# Patient Record
Sex: MALE | Race: WHITE | NOT HISPANIC OR LATINO
[De-identification: names, ages, dates, MRNs, and addresses within clinical notes are randomized per-mention and may not be internally consistent; named-entity substitution may affect disease eponyms.]

---

## 2020-04-26 ENCOUNTER — TRANSCRIPTION ENCOUNTER (OUTPATIENT)
Age: 60
End: 2020-04-26

## 2023-02-07 PROBLEM — Z00.00 ENCOUNTER FOR PREVENTIVE HEALTH EXAMINATION: Status: ACTIVE | Noted: 2023-02-07

## 2023-02-08 ENCOUNTER — APPOINTMENT (OUTPATIENT)
Dept: ORTHOPEDIC SURGERY | Facility: CLINIC | Age: 63
End: 2023-02-08
Payer: COMMERCIAL

## 2023-02-08 VITALS — HEIGHT: 71 IN | BODY MASS INDEX: 37.8 KG/M2 | WEIGHT: 270 LBS

## 2023-02-08 DIAGNOSIS — Z87.2 PERSONAL HISTORY OF DISEASES OF THE SKIN AND SUBCUTANEOUS TISSUE: ICD-10-CM

## 2023-02-08 DIAGNOSIS — I10 ESSENTIAL (PRIMARY) HYPERTENSION: ICD-10-CM

## 2023-02-08 DIAGNOSIS — J45.909 UNSPECIFIED ASTHMA, UNCOMPLICATED: ICD-10-CM

## 2023-02-08 PROCEDURE — 73564 X-RAY EXAM KNEE 4 OR MORE: CPT | Mod: 50

## 2023-02-08 PROCEDURE — 99204 OFFICE O/P NEW MOD 45 MIN: CPT

## 2023-02-08 RX ORDER — ASPIRIN 81 MG
81 TABLET, DELAYED RELEASE (ENTERIC COATED) ORAL
Refills: 0 | Status: ACTIVE | COMMUNITY

## 2023-02-08 RX ORDER — VALSARTAN 80 MG/1
80 TABLET, COATED ORAL
Refills: 0 | Status: ACTIVE | COMMUNITY

## 2023-02-08 RX ORDER — CETIRIZINE HCL 10 MG
TABLET ORAL
Refills: 0 | Status: ACTIVE | COMMUNITY

## 2023-02-08 RX ORDER — ALBUTEROL SULFATE 2.5 MG/3ML
(2.5 MG/3ML) SOLUTION RESPIRATORY (INHALATION)
Refills: 0 | Status: ACTIVE | COMMUNITY

## 2023-02-08 RX ORDER — ATORVASTATIN CALCIUM 40 MG/1
40 TABLET, FILM COATED ORAL
Refills: 0 | Status: ACTIVE | COMMUNITY

## 2023-02-08 NOTE — ASSESSMENT
[FreeTextEntry1] : Underlying pathology reviewed and treatment options discussed. \par 02/08/2023 : xrays B/L knees, 4 views,  reveals chondromalacia of B/L knees, and moderate medial joint narrowing. Right > Left. \par Start PT and HEP to improve mechanics and reduce pain for GT bursitis. \par Activity modifier as tolerated.\par Limits NSAIDs use due to cardiologist recommendations. Takes tylenol. \par Questions addressed.\par Obtain authorization for visco injections.\par \par The documentation recorded by the scribe accurately reflects the service I personally performed and the decisions made by me.\par I, Benedict Bruce, attest that this documentation has been prepared under the direction and in the presence of Provider Gallo Alarcon MD.\par \par The patient was seen by Gallo Alarcon MD.\par The following radiographs were ordered and read by me during this patient's visit. I reviewed each radiograph in detail with the patient, and discussed the findings as highlighted below.\par \par \par

## 2023-02-08 NOTE — PHYSICAL EXAM
[2+] : posterior tibialis pulse: 2+ [Bilateral] : knee bilaterally [NL (0)] : extension 0 degrees [] : patient ambulates without assistive device [TWNoteComboBox7] : flexion 125 degrees

## 2023-02-08 NOTE — HISTORY OF PRESENT ILLNESS
[Gradual] : gradual [6] : 6 [1] : 2 [Dull/Aching] : dull/aching [Localized] : localized [Sharp] : sharp [Occasional] : occasional [Household chores] : household chores [Leisure] : leisure [Rest] : rest [Physical therapy] : physical therapy [Walking] : walking [Stairs] : stairs [Retired] : Work status: retired [de-identified] : 02/08/2023 This is a 61 Yo male with B/L knees, and thigh after onset of covid 2020,  two years ago had bypass surgery. recalls he was sleeping in recliner. HX of lumbar pain in 2019 with Dr. Middleton. Takes baby aspirin. Pain comes and goes, worsens walking long distances. Hx of PT back in 2019 not sure if it helped. Denies n/t. Denies night symptoms.  [] : Post Surgical Visit: no [FreeTextEntry1] : B/L knees [FreeTextEntry5] : This is a 61 y/o M here for bilateral knee pain for many years. L>R No hx of sx to the knees. No n/t. NKI. Pt was seen by Dr. Fabian for the left knee many years ago and had aspiration with some relief. \par \par  [de-identified] : many years ago [de-identified] : Dr. Fabian [de-identified] : none

## 2023-03-01 ENCOUNTER — APPOINTMENT (OUTPATIENT)
Dept: ORTHOPEDIC SURGERY | Facility: CLINIC | Age: 63
End: 2023-03-01
Payer: COMMERCIAL

## 2023-03-01 VITALS — HEIGHT: 71 IN | BODY MASS INDEX: 37.8 KG/M2 | WEIGHT: 270 LBS

## 2023-03-01 PROCEDURE — 20610 DRAIN/INJ JOINT/BURSA W/O US: CPT | Mod: 50

## 2023-03-01 PROCEDURE — 99213 OFFICE O/P EST LOW 20 MIN: CPT | Mod: 25

## 2023-03-01 NOTE — PROCEDURE
[FreeTextEntry3] : Viscosupplementation Injection: X-ray evidence of osteoarthritis on this or prior visit and patient has tried OTC's including aspirin, Ibuprofen, Aleve etc or prescription NSAIDS, and/or exercises at home and/ or physical therapy without satisfactory response. \par \par An injection of Euflexxa 2.5ml #1 was injected into the bilateral knees after verbal consent obtained. \par \par Patient has tried OTC's including aspirin, Ibuprofen, Aleve etc or prescription NSAIDS, and/or exercises at home and/ or physical therapy without satisfactory response and Patient has decreased mobility in the joint. The risks, benefits, and alternatives to cortisone injection were explained in full to the patient. Risks outlined include but are not limited to infection, sepsis, bleeding, scarring, skin discoloration, temporary increase in pain, syncopal episode, failure to resolve symptoms, allergic reaction, and symptom recurrence. Patient understands the risks. All questions were answered. After discussion of options, patient requested an injection. Oral informed consent was obtained. Sterile technique was utilized for the procedure including the preparation of the solutions used for the injection. Injection site was prepped with betadine and alcohol. Ethyl chloride sprayed topically. Sterile technique used. Patient tolerated procedure well. \par \par Post Procedure Instructions: Patient was advised to call if redness, pain, or fever occur. Apply ice for 15 min. every 2 hours for the next 12-24 hours as tolerated. Patient was advised to rest the joint(s) for 1-2 days.\par

## 2023-03-01 NOTE — ASSESSMENT
[FreeTextEntry1] : Underlying pathology reviewed and treatment options discussed. \par 02/08/2023 : xrays B/L knees, 4 views,  reveals chondromalacia of B/L knees, and moderate medial joint narrowing. Right > Left. \par Start PT and HEP to improve mechanics and reduce pain for GT bursitis. \par Activity modifier as tolerated.\par Limits NSAIDs use due to cardiologist recommendations. Takes tylenol. \par Questions addressed.\par Obtain authorization for visco injections.\par \par 3/1/23: Euflexxa #1 bilateral knees tolerated well.\par Ice if sore.\par Return in 1 week to continue.\par \par

## 2023-03-01 NOTE — HISTORY OF PRESENT ILLNESS
[1] : 1 [Euflexxa] : Euflexxa [de-identified] : 3/1/23: F/u bilateral knees. Symptoms continue. He has been going to PT. Bilateral Euflexxa injections have been approved.\par \par 02/08/2023 This is a 61 Yo male with B/L knees, and thigh after onset of covid 2020,  two years ago had bypass surgery. recalls he was sleeping in recliner. HX of lumbar pain in 2019 with Dr. Middleton. Takes baby aspirin. Pain comes and goes, worsens walking long distances. Hx of PT back in 2019 not sure if it helped. Denies n/t. Denies night symptoms.

## 2023-03-01 NOTE — DISCUSSION/SUMMARY
[de-identified] : Progress note completed by MOHIT Ruiz under the direct supervision of Gallo Alarcon M.D.\par

## 2023-03-08 ENCOUNTER — APPOINTMENT (OUTPATIENT)
Dept: ORTHOPEDIC SURGERY | Facility: CLINIC | Age: 63
End: 2023-03-08
Payer: COMMERCIAL

## 2023-03-08 DIAGNOSIS — M11.261 OTHER CHONDROCALCINOSIS, RIGHT KNEE: ICD-10-CM

## 2023-03-08 DIAGNOSIS — M11.262 OTHER CHONDROCALCINOSIS, LEFT KNEE: ICD-10-CM

## 2023-03-08 PROCEDURE — 20610 DRAIN/INJ JOINT/BURSA W/O US: CPT | Mod: 50

## 2023-03-08 PROCEDURE — 99212 OFFICE O/P EST SF 10 MIN: CPT | Mod: 25

## 2023-03-08 NOTE — PHYSICAL EXAM
[2+] : posterior tibialis pulse: 2+ [Bilateral] : knee bilaterally [NL (0)] : extension 0 degrees [] : no erythema [TWNoteComboBox7] : flexion 125 degrees

## 2023-03-08 NOTE — ASSESSMENT
[FreeTextEntry1] : Underlying pathology reviewed and treatment options discussed. \par 02/08/2023 : xrays B/L knees, 4 views,  reveals chondromalacia of B/L knees, and moderate medial joint narrowing. Right > Left. \par Start PT and HEP to improve mechanics and reduce pain for GT bursitis. \par Activity modifier as tolerated.\par Limits NSAIDs use due to cardiologist recommendations. Takes tylenol. \par Questions addressed.\par Obtain authorization for visco injections.\par \par 3/1/23: Euflexxa #1 bilateral knees tolerated well.\par Ice if sore.\par Return in 1 week to continue.\par \par 03/08/2023 2nd euflexxa B/L knees. \par Apply ice to affected area.\par RTO 1 wk. \par

## 2023-03-08 NOTE — HISTORY OF PRESENT ILLNESS
[1] : 1 [Euflexxa] : Euflexxa [de-identified] : 3/8/23: F/u bilateral knees Euflexxa INJ #2. Symptoms continue. Denies complication with prior injection.\par \par 3/1/23: F/u bilateral knees. Symptoms continue. He has been going to PT. Bilateral Euflexxa injections have been approved.\par \par 02/08/2023 This is a 61 Yo male with B/L knees, and thigh after onset of covid 2020,  two years ago had bypass surgery. recalls he was sleeping in recliner. HX of lumbar pain in 2019 with Dr. Middleton. Takes baby aspirin. Pain comes and goes, worsens walking long distances. Hx of PT back in 2019 not sure if it helped. Denies n/t. Denies night symptoms.

## 2023-03-08 NOTE — PROCEDURE
[FreeTextEntry3] : Viscosupplementation Injection: X-ray evidence of osteoarthritis on this or prior visit and patient has tried OTC's including aspirin, Ibuprofen, Aleve etc or prescription NSAIDS, and/or exercises at home and/ or physical therapy without satisfactory response. \par \par An injection of Euflexxa 2.5ml #2 was injected into the bilateral knees after verbal consent obtained. \par \par Patient has tried OTC's including aspirin, Ibuprofen, Aleve etc or prescription NSAIDS, and/or exercises at home and/ or physical therapy without satisfactory response and Patient has decreased mobility in the joint. The risks, benefits, and alternatives to cortisone injection were explained in full to the patient. Risks outlined include but are not limited to infection, sepsis, bleeding, scarring, skin discoloration, temporary increase in pain, syncopal episode, failure to resolve symptoms, allergic reaction, and symptom recurrence. Patient understands the risks. All questions were answered. After discussion of options, patient requested an injection. Oral informed consent was obtained. Sterile technique was utilized for the procedure including the preparation of the solutions used for the injection. Injection site was prepped with betadine and alcohol. Ethyl chloride sprayed topically. Sterile technique used. Patient tolerated procedure well. \par \par Post Procedure Instructions: Patient was advised to call if redness, pain, or fever occur. Apply ice for 15 min. every 2 hours for the next 12-24 hours as tolerated. Patient was advised to rest the joint(s) for 1-2 days.\par

## 2023-03-15 ENCOUNTER — APPOINTMENT (OUTPATIENT)
Dept: ORTHOPEDIC SURGERY | Facility: CLINIC | Age: 63
End: 2023-03-15
Payer: COMMERCIAL

## 2023-03-15 VITALS — HEIGHT: 71 IN | BODY MASS INDEX: 37.8 KG/M2 | WEIGHT: 270 LBS

## 2023-03-15 DIAGNOSIS — M70.62 TROCHANTERIC BURSITIS, RIGHT HIP: ICD-10-CM

## 2023-03-15 DIAGNOSIS — M70.61 TROCHANTERIC BURSITIS, RIGHT HIP: ICD-10-CM

## 2023-03-15 PROCEDURE — 20610 DRAIN/INJ JOINT/BURSA W/O US: CPT | Mod: 50

## 2023-03-15 PROCEDURE — 99212 OFFICE O/P EST SF 10 MIN: CPT | Mod: 25

## 2023-03-15 NOTE — DISCUSSION/SUMMARY
[de-identified] : Progress note completed by MOHIT Ruiz under the direct supervision of Gallo Alarcon M.D.\par 
Calm

## 2023-03-15 NOTE — ASSESSMENT
[FreeTextEntry1] : Underlying pathology reviewed and treatment options discussed. \par 02/08/2023 : xrays B/L knees, 4 views,  reveals chondromalacia of B/L knees, and moderate medial joint narrowing. Right > Left. \par Start PT and HEP to improve mechanics and reduce pain for GT bursitis. \par Activity modifier as tolerated.\par Limits NSAIDs use due to cardiologist recommendations. Takes tylenol. \par Questions addressed.\par Obtain authorization for visco injections.\par \par 3/1/23: Euflexxa #1 bilateral knees tolerated well.\par Ice if sore.\par Return in 1 week to continue.\par \par 03/08/2023 2nd euflexxa B/L knees. \par Apply ice to affected area.\par RTO 1 wk. \par \par 3/15/23: Euflexxa #3 bilateral knee tolerated well.\par Continue PT.\par RTO in 6 weeks if needed.\par

## 2023-03-15 NOTE — PROCEDURE
[Bilateral] : bilaterally of the [Knee] : knee [Alcohol] : alcohol [Betadine] : betadine [Ethyl Chloride sprayed topically] : ethyl chloride sprayed topically [Sterile technique used] : sterile technique used [Euflexxa(20mg)] : 20mg of Euflexxa [#3] : series #3 [FreeTextEntry3] : Viscosupplementation Injection: X-ray evidence of osteoarthritis on this or prior visit and patient has tried OTC's including aspirin, Ibuprofen, Aleve etc or prescription NSAIDS, and/or exercises at home and/ or physical therapy without satisfactory response. \par \par An injection of Euflexxa 2.5ml #3 was injected into the bilateral knees after verbal consent obtained. \par \par Patient has tried OTC's including aspirin, Ibuprofen, Aleve etc or prescription NSAIDS, and/or exercises at home and/ or physical therapy without satisfactory response and Patient has decreased mobility in the joint. The risks, benefits, and alternatives to cortisone injection were explained in full to the patient. Risks outlined include but are not limited to infection, sepsis, bleeding, scarring, skin discoloration, temporary increase in pain, syncopal episode, failure to resolve symptoms, allergic reaction, and symptom recurrence. Patient understands the risks. All questions were answered. After discussion of options, patient requested an injection. Oral informed consent was obtained. Sterile technique was utilized for the procedure including the preparation of the solutions used for the injection. Injection site was prepped with betadine and alcohol. Ethyl chloride sprayed topically. Sterile technique used. Patient tolerated procedure well. \par \par 8.5 cc normal appearing synovial fluid aspirated from the right knee.\par Post Procedure Instructions: Patient was advised to call if redness, pain, or fever occur. Apply ice for 15 min. every 2 hours for the next 12-24 hours as tolerated. Patient was advised to rest the joint(s) for 1-2 days.\par

## 2023-03-15 NOTE — HISTORY OF PRESENT ILLNESS
[3] : 3 [Euflexxa] : Euflexxa [de-identified] : 3/15/23: Euflexxa #3 bilateralknees. Symptoms continue with some improvement. Denies complication with prior injection.\par \par 3/8/23: F/u bilateral knees Euflexxa INJ #2. Symptoms continue. Denies complication with prior injection.\par \par 3/1/23: F/u bilateral knees. Symptoms continue. He has been going to PT. Bilateral Euflexxa injections have been approved.\par \par 02/08/2023 This is a 61 Yo male with B/L knees, and thigh after onset of covid 2020,  two years ago had bypass surgery. recalls he was sleeping in recliner. HX of lumbar pain in 2019 with Dr. Middleton. Takes baby aspirin. Pain comes and goes, worsens walking long distances. Hx of PT back in 2019 not sure if it helped. Denies n/t. Denies night symptoms.  [] : Post Surgical Visit: no [de-identified] : euflexxa injections [de-identified] : b/l knees

## 2023-05-24 ENCOUNTER — APPOINTMENT (OUTPATIENT)
Dept: HEPATOLOGY | Facility: CLINIC | Age: 63
End: 2023-05-24
Payer: COMMERCIAL

## 2023-05-24 VITALS
HEART RATE: 88 BPM | DIASTOLIC BLOOD PRESSURE: 80 MMHG | SYSTOLIC BLOOD PRESSURE: 154 MMHG | RESPIRATION RATE: 16 BRPM | OXYGEN SATURATION: 97 % | BODY MASS INDEX: 38.78 KG/M2 | HEIGHT: 71 IN | WEIGHT: 277 LBS | TEMPERATURE: 98 F

## 2023-05-24 PROCEDURE — 99204 OFFICE O/P NEW MOD 45 MIN: CPT

## 2023-05-24 NOTE — HISTORY OF PRESENT ILLNESS
[de-identified] : 63 y/o m ref by Dr. Lew Thomson for evaluation of NAFLD. He had a colonoscopy and was noted to have abnl liver tests, w/u done showed + smooth muscle ab; neg viral serologies and neg AMA; US 5/1/23- showed fatty liver; of note plt were 140. Pt denies prior hx of w/u for liver dz. 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

## 2023-05-24 NOTE — ASSESSMENT
[FreeTextEntry1] : 61 y/o m ref by Dr. Lew Thomson for evaluation of NAFLD.  Imaging reports and labs reviewed. following Issues were d/w pt in detail. \par \par has risk factors for NAFLD; smooth muscle likely FP in this setting; lfts are normal \par \par Patient is aware that they have several components of the metabolic syndrome including weight gain during adulthood. The benefit of a low glycemic diet and exercise were discussed. The patient needs improved treatment of diabetes, HTN, elevated cholesterol as appropriate. Long term sequelae of Fatty liver disease including progression to decompensated cirrhosis and hcc explained to pt.  The utility of nutrition consult explained. The role of liver biopsy also discussed.  Patient demonstrated understanding.\par Will order fibroscan, Factors associated with unreliable results included BMI >30 kg/m2, age >52 years, female sex,  inexperience, and type 2 diabetes mellitus discussed. MRE is also a consideration after fibroscan\par Will also order chronic liver disease w/u and other imaging as needed\par \par \par has low plt, may need MRE if fibroscan is not helpful\par nutrition and activity advise given\par advised to see pmd for KELLI eval and consider GLP1a\par f/u in 3 months\par

## 2023-05-24 NOTE — PHYSICAL EXAM
[General Appearance - Alert] : alert [FreeTextEntry1] : obese [Sclera] : the sclera and conjunctiva were normal [Neck Appearance] : the appearance of the neck was normal [Respiration, Rhythm And Depth] : normal respiratory rhythm and effort [Auscultation Breath Sounds / Voice Sounds] : lungs were clear to auscultation bilaterally [Edema] : there was no peripheral edema [Bowel Sounds] : normal bowel sounds [Abdomen Soft] : soft [Abdomen Tenderness] : non-tender [Oriented To Time, Place, And Person] : oriented to person, place, and time

## 2023-06-08 ENCOUNTER — NON-APPOINTMENT (OUTPATIENT)
Age: 63
End: 2023-06-08

## 2023-06-08 ENCOUNTER — APPOINTMENT (OUTPATIENT)
Dept: HEPATOLOGY | Facility: CLINIC | Age: 63
End: 2023-06-08

## 2023-06-16 ENCOUNTER — NON-APPOINTMENT (OUTPATIENT)
Age: 63
End: 2023-06-16

## 2023-06-16 DIAGNOSIS — K76.0 FATTY (CHANGE OF) LIVER, NOT ELSEWHERE CLASSIFIED: ICD-10-CM

## 2023-06-20 ENCOUNTER — APPOINTMENT (OUTPATIENT)
Dept: ORTHOPEDIC SURGERY | Facility: CLINIC | Age: 63
End: 2023-06-20
Payer: COMMERCIAL

## 2023-06-20 ENCOUNTER — TRANSCRIPTION ENCOUNTER (OUTPATIENT)
Age: 63
End: 2023-06-20

## 2023-06-20 VITALS — WEIGHT: 270 LBS | HEIGHT: 71 IN | BODY MASS INDEX: 37.8 KG/M2

## 2023-06-20 DIAGNOSIS — M25.551 PAIN IN RIGHT HIP: ICD-10-CM

## 2023-06-20 DIAGNOSIS — M25.552 PAIN IN RIGHT HIP: ICD-10-CM

## 2023-06-20 DIAGNOSIS — M54.50 LOW BACK PAIN, UNSPECIFIED: ICD-10-CM

## 2023-06-20 PROCEDURE — 73522 X-RAY EXAM HIPS BI 3-4 VIEWS: CPT

## 2023-06-20 PROCEDURE — 99214 OFFICE O/P EST MOD 30 MIN: CPT

## 2023-06-20 NOTE — ASSESSMENT
[FreeTextEntry1] : 6/20/23: Pt with bilateral hip and lumbar pain. does not look like it is related to mild arthritis. Likely abductor injury or tendonitis vs ddd. would recommend mri of bilateral hips and lumbar spine as he has failed oral antiinflammatories and 2-3 months of PT. Follow up after MRI

## 2023-06-20 NOTE — HISTORY OF PRESENT ILLNESS
[Dull/Aching] : dull/aching [Radiating] : radiating [de-identified] : 6/20/23: 62M presenting with b/l hip pain that started 2-3 years ago following bypass surgery. Pain is exacerbated by walking, even short distances are painful. No groin pain. Most pain is on lateral aspect of hips. Left worse than right. Pain when getting up from a seated position. Pt is not taking any medication. Cannot take NSAIDs. Denies N/T. Pt also notes upper back pain but believes it to be from weight lifting at the gym a few days ago- not main concern. Has had PT in the past with no sig benefit. Pain is affecting daily life. [FreeTextEntry5] : haseeb hip L>R pain has seen Dr Alarcon in the past pain is localized into the glute pain worse activity has tried ice, nsaids

## 2023-06-20 NOTE — IMAGING
[Bilateral] : hip with pelvis bilaterally [Mild arthritis (Tonnis Grade 1)] : Mild arthritis (Tonnis Grade 1) [de-identified] : left hip \par loss of internal rotation\par no pain\par - impingement\par \par right hip\par good ROM\par nontender\par \par b/l hips\par 5/5 strength\par good pulse\par \par l-spine\par greater troch tenderness\par lower lumbar tenderness with palpation

## 2023-06-22 ENCOUNTER — FORM ENCOUNTER (OUTPATIENT)
Age: 63
End: 2023-06-22

## 2023-06-23 ENCOUNTER — APPOINTMENT (OUTPATIENT)
Dept: MRI IMAGING | Facility: CLINIC | Age: 63
End: 2023-06-23
Payer: COMMERCIAL

## 2023-06-23 PROCEDURE — 72148 MRI LUMBAR SPINE W/O DYE: CPT

## 2023-06-23 PROCEDURE — 73721 MRI JNT OF LWR EXTRE W/O DYE: CPT | Mod: LT

## 2023-06-23 PROCEDURE — 73721 MRI JNT OF LWR EXTRE W/O DYE: CPT | Mod: RT

## 2023-06-27 ENCOUNTER — APPOINTMENT (OUTPATIENT)
Dept: ORTHOPEDIC SURGERY | Facility: CLINIC | Age: 63
End: 2023-06-27
Payer: COMMERCIAL

## 2023-06-27 PROCEDURE — 99214 OFFICE O/P EST MOD 30 MIN: CPT

## 2023-06-27 RX ORDER — MELOXICAM 15 MG/1
15 TABLET ORAL
Qty: 30 | Refills: 1 | Status: ACTIVE | COMMUNITY
Start: 2023-06-27 | End: 1900-01-01

## 2023-06-27 NOTE — HISTORY OF PRESENT ILLNESS
[Dull/Aching] : dull/aching [Radiating] : radiating [de-identified] : 6/27/23: Pt here to review b/l hip MRI. \par \par Previous doc: \par 6/20/23: 62M presenting with b/l hip pain that started 2-3 years ago following bypass surgery. Pain is exacerbated by walking, even short distances are painful. No groin pain. Most pain is on lateral aspect of hips. Left worse than right. Pain when getting up from a seated position. Pt is not taking any medication. Cannot take NSAIDs. Denies N/T. Pt also notes upper back pain but believes it to be from weight lifting at the gym a few days ago- not main concern. Has had PT in the past with no sig benefit. Pain is affecting daily life. [FreeTextEntry5] : haseeb hip L>R pain. HERE FOR MRI REVIEW

## 2023-06-27 NOTE — DATA REVIEWED
[FreeTextEntry1] : MRI L hip (OCOA) 6/23/23\par 1. Mild left hip arthrosis with degenerative appearing superior labral tearing, chondral loss, and bone spurs without marrow edema, effusion, malalignment, or loose body at the left hip.\par 2. Degenerative changes in the lower lumbar spine.\par 3. Mild bursitis surrounding the right hamstring tendon insertion without tear.\par 4. Small bilateral inguinal hernias containing fat suspected left greater than right.\par 5. No acute fracture involving the visualized bony pelvis.\par 6. Please see MRI of the right hip performed on the same date and dictated under separate report. [FreeTextEntry2] : MRI R hip (OCOA) 6/23/23\par 1. Superior labral tearing with chondral loss and slight bone spurs in the superior lateral aspect of the right hip joint without acute fracture, malalignment, effusion or loose body.\par 2. Degenerative changes in the lower lumbar spine.\par 3. Small bilateral inguinal hernias containing fat.\par 4. Mild bursitis at the right hamstring tendon insertion without tear.\par 5. No acute fracture involving the visualized bony pelvis.\par 6. Please see MRI of the left hip performed on the same date and dictated under separate report. [FreeTextEntry3] : MRI L-spine ([facility]) 6/23/23\par 1. Exaggerated lumbar lordosis, convexity of the lumbar spine towards the left, retrolisthesis at L3-L4, and multilevel degenerative disc disease.\par 2. Severe left foraminal herniation and bony ridging impinging and likely compressing the left exiting L3 nerve root at L3-L4.\par 3. Right exiting L3 nerve root impingement at L3-L4.\par 4. Mild central stenosis with left greater than right exiting L4 nerve root impingement at L4-L5.\par 5. Impingement upon left S1 and left exiting L5 nerve roots at L5-S1.\par 6. Multilevel disc calcification and spondylosis anteriorly and laterally most prominent on the left at L3-L4, anteriorly on the right at L2-L3, in the lower thoracic spine and upper lumbar spine on the left and L5-S1 bilaterally.\par 7. Nonspecific perinephric stranding and scarring surrounding the kidneys bilaterally left greater than right of uncertain clinical significance. Clinical correlation regarding any possible prior surgery particularly involving the left kidney is recommended. Recommend ultrasound of the kidneys to further evaluate as clinically indicated.

## 2023-06-27 NOTE — ASSESSMENT
[FreeTextEntry1] : 6/20/23: Pt with bilateral hip and lumbar pain. does not look like it is related to mild arthritis. Likely abductor injury or tendonitis vs ddd. would recommend mri of bilateral hips and lumbar spine as he has failed oral antiinflammatories and 2-3 months of PT. Follow up after MRI\par \par 6/27/23:  mult reason in his hip and l spine  - we will restart PT focused on hip and spine asnd start Mobic for a few weeks -  may need hip or l spine injection

## 2023-06-27 NOTE — IMAGING
[Bilateral] : hip with pelvis bilaterally [Mild arthritis (Tonnis Grade 1)] : Mild arthritis (Tonnis Grade 1) [de-identified] : left hip \par loss of internal rotation\par no pain\par - impingement\par \par right hip\par good ROM\par nontender\par \par b/l hips\par 5/5 strength\par good pulse\par \par l-spine\par greater troch tenderness\par lower lumbar tenderness with palpation\par \par

## 2023-08-04 ENCOUNTER — RESULT REVIEW (OUTPATIENT)
Age: 63
End: 2023-08-04

## 2023-08-04 ENCOUNTER — OUTPATIENT (OUTPATIENT)
Dept: OUTPATIENT SERVICES | Facility: HOSPITAL | Age: 63
LOS: 1 days | End: 2023-08-04
Payer: COMMERCIAL

## 2023-08-04 ENCOUNTER — APPOINTMENT (OUTPATIENT)
Dept: MRI IMAGING | Facility: CLINIC | Age: 63
End: 2023-08-04
Payer: COMMERCIAL

## 2023-08-04 DIAGNOSIS — K76.0 FATTY (CHANGE OF) LIVER, NOT ELSEWHERE CLASSIFIED: ICD-10-CM

## 2023-08-04 PROCEDURE — 74183 MRI ABD W/O CNTR FLWD CNTR: CPT | Mod: 26

## 2023-08-04 PROCEDURE — 76391 MR ELASTOGRAPHY: CPT

## 2023-08-04 PROCEDURE — 76391 MR ELASTOGRAPHY: CPT | Mod: 26

## 2023-08-04 PROCEDURE — A9585: CPT

## 2023-08-04 PROCEDURE — 74183 MRI ABD W/O CNTR FLWD CNTR: CPT

## 2023-08-08 ENCOUNTER — APPOINTMENT (OUTPATIENT)
Dept: ORTHOPEDIC SURGERY | Facility: CLINIC | Age: 63
End: 2023-08-08

## 2023-08-21 ENCOUNTER — NON-APPOINTMENT (OUTPATIENT)
Age: 63
End: 2023-08-21

## 2023-11-19 ENCOUNTER — OUTPATIENT (OUTPATIENT)
Dept: OUTPATIENT SERVICES | Facility: HOSPITAL | Age: 63
LOS: 1 days | End: 2023-11-19
Payer: COMMERCIAL

## 2023-11-19 ENCOUNTER — APPOINTMENT (OUTPATIENT)
Dept: MRI IMAGING | Facility: CLINIC | Age: 63
End: 2023-11-19
Payer: COMMERCIAL

## 2023-11-19 ENCOUNTER — RESULT REVIEW (OUTPATIENT)
Age: 63
End: 2023-11-19

## 2023-11-19 DIAGNOSIS — K76.0 FATTY (CHANGE OF) LIVER, NOT ELSEWHERE CLASSIFIED: ICD-10-CM

## 2023-11-19 PROCEDURE — 72197 MRI PELVIS W/O & W/DYE: CPT | Mod: 26

## 2023-11-19 PROCEDURE — 72197 MRI PELVIS W/O & W/DYE: CPT

## 2023-11-19 PROCEDURE — 74183 MRI ABD W/O CNTR FLWD CNTR: CPT | Mod: 26

## 2023-11-19 PROCEDURE — A9581: CPT

## 2023-11-19 PROCEDURE — 74183 MRI ABD W/O CNTR FLWD CNTR: CPT

## 2024-03-07 ENCOUNTER — APPOINTMENT (OUTPATIENT)
Dept: ORTHOPEDIC SURGERY | Facility: CLINIC | Age: 64
End: 2024-03-07
Payer: COMMERCIAL

## 2024-03-07 VITALS — WEIGHT: 270 LBS | HEIGHT: 61 IN | BODY MASS INDEX: 50.98 KG/M2

## 2024-03-07 DIAGNOSIS — M75.42 IMPINGEMENT SYNDROME OF LEFT SHOULDER: ICD-10-CM

## 2024-03-07 DIAGNOSIS — M19.012 PRIMARY OSTEOARTHRITIS, LEFT SHOULDER: ICD-10-CM

## 2024-03-07 DIAGNOSIS — M79.18 MYALGIA, OTHER SITE: ICD-10-CM

## 2024-03-07 PROCEDURE — 99214 OFFICE O/P EST MOD 30 MIN: CPT | Mod: 25

## 2024-03-07 PROCEDURE — 73030 X-RAY EXAM OF SHOULDER: CPT | Mod: LT

## 2024-03-07 PROCEDURE — 20611 DRAIN/INJ JOINT/BURSA W/US: CPT | Mod: LT

## 2024-03-07 PROCEDURE — 73010 X-RAY EXAM OF SHOULDER BLADE: CPT | Mod: LT

## 2024-03-07 PROCEDURE — J3490M: CUSTOM

## 2024-03-07 NOTE — IMAGING
[de-identified] :  LEFT SHOULDER Inspection: No swelling.  Palpation: Tenderness is noted at the bicipital groove, anterior and lateral.  Range of motion: There is pain with range of motion. , ER 50, @90ER 90, @90IR 30 Strength: There is pain and discomfort with strength testing. Forward Flexion 4/5. Abduction 4/5.  External Rotation 5-/5 and Internal Rotation 5/5  Neurological testings: motor and sensor intact distally. Ligament Stability and Special Tests:  There is positive arc of pain.  Shoulder apprehension: neg Shoulder relocation: neg Obriens test: pos Biceps Active test: neg Castañeda Labral Shear: neg Impingement testing: pos Anthony testing: pos Whipple: pos Cross Body Adduction: neg l

## 2024-03-07 NOTE — ASSESSMENT
[FreeTextEntry1] : Left X-Ray Examination of the SHOULDER (2 views):  No fractures, subluxations or dislocations. GH deg changes X-Ray Examination of the SCAPULA 1 or 2 views shows: No significant abnormalities.  Acromial spur.   - We discussed their diagnosis and treatment options at length including the risks and benefits of both surgical and non-surgical options. - Due to risks of surgery, we will continue conservative treatment with PT, icing, and anti-inflammatory medications - The patient was provided with a prescription to work on scapular strengthening and rotator cuff strengthening. - The patient was advised to let pain guide the gradual advancement of activities. - We also discussed the possible of a corticosteroid injection in order to help decrease inflammation and pain so that they can perform better therapy. - The risks, benefits, and alternatives to corticosteroid injection were reviewed with the patient and they wished to proceed with this treatment course. - Follow up as needed in 6 weeks to re-evaluate progress with therapy

## 2024-03-07 NOTE — HISTORY OF PRESENT ILLNESS
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 Has tried ice , activtiy mod PMHx long covid symptoms, cardaic bypass surgery in past - limit nsaids**

## 2024-03-15 ENCOUNTER — APPOINTMENT (OUTPATIENT)
Dept: ORTHOPEDIC SURGERY | Facility: CLINIC | Age: 64
End: 2024-03-15
Payer: COMMERCIAL

## 2024-03-15 VITALS — HEIGHT: 71 IN | BODY MASS INDEX: 36.4 KG/M2 | WEIGHT: 260 LBS

## 2024-03-15 DIAGNOSIS — Z87.448 PERSONAL HISTORY OF OTHER DISEASES OF URINARY SYSTEM: ICD-10-CM

## 2024-03-15 DIAGNOSIS — Z87.19 PERSONAL HISTORY OF OTHER DISEASES OF THE DIGESTIVE SYSTEM: ICD-10-CM

## 2024-03-15 DIAGNOSIS — M16.0 BILATERAL PRIMARY OSTEOARTHRITIS OF HIP: ICD-10-CM

## 2024-03-15 DIAGNOSIS — M47.816 SPONDYLOSIS W/OUT MYELOPATHY OR RADICULOPATHY, LUMBAR REGION: ICD-10-CM

## 2024-03-15 DIAGNOSIS — Z78.9 OTHER SPECIFIED HEALTH STATUS: ICD-10-CM

## 2024-03-15 PROCEDURE — ZZZZZ: CPT

## 2024-03-15 NOTE — DISCUSSION/SUMMARY
[de-identified] : The natural progression of Osteoarthritis was explained to the patient.  We discussed the possible treatment options from conservative to operative.  These included NSAIDS, Glucosamine and Chondrotin sulfate, and Physical Therapy as well different types of injections.  We also discussed that at some point they may progress to needed a TKA.  Information and pamphlets were given when appropriate.  The natural progression of Osteoarthritis was explained to the patient.  We discussed the possible treatment options from conservative to operative.  These included NSAIDS, Glucosamine and Chondrotin sulfate, and Physical Therapy as well different types of injections.  We also discussed that at some point they may progress to needed a CLAIRE.  Information and pamphlets were given when approprate.  The risks, benefits, contents and alternatives to injection were explained in full to the patient.  Risks outlined include but are not limited to infection, sepsis, bleeding, scarring, skin discoloration, temporary increase in pain, syncopal episode, failure to resolve symptoms, allergic reaction, flare reaction, permanent white skin discoloration, symptom recurrence, and elevation of blood sugar in diabetics.  Patient understood the risks.  All questions were answered.  After discussion of options, patient requested an injection.  Oral informed consent was obtained and sterile prep was done of the injection site.  Sterile technique was used to introduce the mixture.  Patient tolerated the procedure well.  Patient advised to ice the injection site this evening.  Signs and symptoms of infection reviewed and patient advised to call immediately for redness, fevers, and/or chills.  Entered by Blessing Cabrera acting as a scribe.

## 2024-03-15 NOTE — HISTORY OF PRESENT ILLNESS
[Retired] : Work status: retired [de-identified] : 3/15/24: Here to f/up LT hip and LT knee. Did PT over the summer (for the hip_- unsure if benefit. States he has been more active recently and doing work around the house and having increasing pain. Most pain laterally- no groin pain. States LT knee bothers him the most wth going down stairs. Saw Dr. Alarcon last year and did Euflexxa series for b/l knees- helped more in the RT knee. Not taking meds for pain- no NSAIDs due to hx of bypass surgery. Ambulates without assistance.   Previous doc:  6/20/23: 62M presenting with b/l hip pain that started 2-3 years ago following bypass surgery. Pain is exacerbated by walking, even short distances are painful. No groin pain. Most pain is on lateral aspect of hips. Left worse than right. Pain when getting up from a seated position. Pt is not taking any medication. Cannot take NSAIDs. Denies N/T. Pt also notes upper back pain but believes it to be from weight lifting at the gym a few days ago- not main concern. Has had PT in the past with no sig benefit. Pain is affecting daily life. 6/27/23: Pt here to review b/l hip MRI.  [] : Post Surgical Visit: no [FreeTextEntry5] : like to start Euflexxa  Hip doing the same , like to have CSI

## 2024-03-15 NOTE — IMAGING
[de-identified] : left hip \par  loss of internal rotation\par  no pain\par  - impingement\par  \par  right hip\par  good ROM\par  nontender\par  \par  b/l hips\par  5/5 strength\par  good pulse\par  \par  l-spine\par  greater troch tenderness\par  lower lumbar tenderness with palpation\par  \par

## 2024-03-15 NOTE — PROCEDURE
[Large Joint Injection] : Large joint injection [Left] : of the left [Greater Trochanteric Bursa] : greater trochanteric bursa [Pain] : pain [Inflammation] : inflammation [X-ray evidence of Osteoarthritis on this or prior visit] : x-ray evidence of Osteoarthritis on this or prior visit [Alcohol] : alcohol [Betadine] : betadine [Ethyl Chloride sprayed topically] : ethyl chloride sprayed topically [Sterile technique used] : sterile technique used [___ cc    3mg] :  Betamethasone (Celestone) ~Vcc of 3mg [___ cc    1%] : Lidocaine ~Vcc of 1%  [___ cc    0.25%] : Bupivacaine (Marcaine) ~Vcc of 0.25%  [] : Patient tolerated procedure well [Call if redness, pain or fever occur] : call if redness, pain or fever occur [Previous OTC use and PT nontherapeutic] : patient has tried OTC's including aspirin, Ibuprofen, Aleve, etc or prescription NSAIDS, and/or exercises at home and/or physical therapy without satisfactory response [Patient had decreased mobility in the joint] : patient had decreased mobility in the joint [All ultrasound images have been permanently captured and stored accordingly in our picture archiving and communication system] : All ultrasound images have been permanently captured and stored accordingly in our picture archiving and communication system [Risks, benefits, alternatives discussed / Verbal consent obtained] : the risks benefits, and alternatives have been discussed, and verbal consent was obtained [Visualization of the needle and placement of injection was performed without complication] : visualization of the needle and placement of injection was performed without complication

## 2024-03-15 NOTE — DATA REVIEWED
[FreeTextEntry1] :  1. Mild left hip arthrosis with degenerative appearing superior labral tearing, chondral loss, and bone spurs without marrow edema, effusion, malalignment, or loose body at the left hip. 2. Degenerative changes in the lower lumbar spine. 3. Mild bursitis surrounding the right hamstring tendon insertion without tear. 4. Small bilateral inguinal hernias containing fat suspected left greater than right. 5. No acute fracture involving the visualized bony pelvis. 6. Please see MRI of the right hip performed on the same date and dictated under separate report. [FreeTextEntry3] : MRI L-spine (OCOA) 6/23/23\par  1. Exaggerated lumbar lordosis, convexity of the lumbar spine towards the left, retrolisthesis at L3-L4, and multilevel degenerative disc disease.\par  2. Severe left foraminal herniation and bony ridging impinging and likely compressing the left exiting L3 nerve root at L3-L4.\par  3. Right exiting L3 nerve root impingement at L3-L4.\par  4. Mild central stenosis with left greater than right exiting L4 nerve root impingement at L4-L5.\par  5. Impingement upon left S1 and left exiting L5 nerve roots at L5-S1.\par  6. Multilevel disc calcification and spondylosis anteriorly and laterally most prominent on the left at L3-L4, anteriorly on the right at L2-L3, in the lower thoracic spine and upper lumbar spine on the left and L5-S1 bilaterally.\par  7. Nonspecific perinephric stranding and scarring surrounding the kidneys bilaterally left greater than right of uncertain clinical significance. Clinical correlation regarding any possible prior surgery particularly involving the left kidney is recommended. Recommend ultrasound of the kidneys to further evaluate as clinically indicated. [FreeTextEntry2] :  1. Superior labral tearing with chondral loss and slight bone spurs in the superior lateral aspect of the right hip joint without acute fracture, malalignment, effusion or loose body. 2. Degenerative changes in the lower lumbar spine. 3. Small bilateral inguinal hernias containing fat. 4. Mild bursitis at the right hamstring tendon insertion without tear. 5. No acute fracture involving the visualized bony pelvis. 6. Please see MRI of the left hip performed on the same date and dictated under separate report.

## 2024-03-15 NOTE — ASSESSMENT
[FreeTextEntry1] : 6/20/23: Pt with bilateral hip and lumbar pain. does not look like it is related to mild arthritis. Likely abductor injury or tendonitis vs ddd. would recommend mri of bilateral hips and lumbar spine as he has failed oral antiinflammatories and 2-3 months of PT. Follow up after MRI 6/27/23:  mult reason in his hip and l spine  - we will restart PT focused on hip and spine asnd start Mobic for a few weeks -  may need hip or l spine injection     3/15/24: Pt with moderate b/l knee OA (L>R), mild hip OA and some lumbar DDD. I believe his pain is likely a combination of these things. Discussed treatment options- risks and benefits explained. He is well informed and would like to proceed with LT hip bursa injection. Will put in auth for b/l knee Euflexxa. Follow up 2 weeks.

## 2024-03-29 ENCOUNTER — APPOINTMENT (OUTPATIENT)
Dept: ORTHOPEDIC SURGERY | Facility: CLINIC | Age: 64
End: 2024-03-29
Payer: COMMERCIAL

## 2024-03-29 PROCEDURE — 20611 DRAIN/INJ JOINT/BURSA W/US: CPT | Mod: 50

## 2024-03-29 NOTE — ASSESSMENT
[FreeTextEntry1] : Previous doc: 6/20/23: Pt with bilateral hip and lumbar pain. does not look like it is related to mild arthritis. Likely abductor injury or tendonitis vs ddd. would recommend mri of bilateral hips and lumbar spine as he has failed oral antiinflammatories and 2-3 months of PT. Follow up after MRI 6/27/23:  mult reason in his hip and l spine  - we will restart PT focused on hip and spine asnd start Mobic for a few weeks -  may need hip or l spine injection    3/15/24: Pt with moderate b/l knee OA (L>R), mild hip OA and some lumbar DDD. I believe his pain is likely a combination of these things. Discussed treatment options- risks and benefits explained. He is well informed and would like to proceed with LT hip bursa injection. Will put in auth for b/l knee Euflexxa. Follow up 2 weeks.   3/29/24: Inj tolerated well.

## 2024-03-29 NOTE — HISTORY OF PRESENT ILLNESS
[Dull/Aching] : dull/aching [1] : 1 [Euflexxa] : Euflexxa [de-identified] : 3/29/24: Euflexxa #1 b/l knees.  Previous doc:  6/20/23: 62M presenting with b/l hip pain that started 2-3 years ago following bypass surgery. Pain is exacerbated by walking, even short distances are painful. No groin pain. Most pain is on lateral aspect of hips. Left worse than right. Pain when getting up from a seated position. Pt is not taking any medication. Cannot take NSAIDs. Denies N/T. Pt also notes upper back pain but believes it to be from weight lifting at the gym a few days ago- not main concern. Has had PT in the past with no sig benefit. Pain is affecting daily life. 6/27/23: Pt here to review b/l hip MRI.  3/15/24: Here to f/up LT hip and LT knee. Did PT over the summer (for the hip_- unsure if benefit. States he has been more active recently and doing work around the house and having increasing pain. Most pain laterally- no groin pain. States LT knee bothers him the most wth going down stairs. Saw Dr. Alarcon last year and did Euflexxa series for b/l knees- helped more in the RT knee. Not taking meds for pain- no NSAIDs due to hx of bypass surgery. Ambulates without assistance.  [de-identified] : haseeb knees  [de-identified] : Euflexxa

## 2024-03-29 NOTE — IMAGING
[de-identified] : left hip \par  loss of internal rotation\par  no pain\par  - impingement\par  \par  right hip\par  good ROM\par  nontender\par  \par  b/l hips\par  5/5 strength\par  good pulse\par  \par  l-spine\par  greater troch tenderness\par  lower lumbar tenderness with palpation\par  \par

## 2024-03-29 NOTE — PROCEDURE
[Large Joint Injection] : Large joint injection [Bilateral] : bilaterally of the [Knee] : knee [Pain] : pain [Inflammation] : inflammation [Repeat series performed] : repeat series performed [X-ray evidence of Osteoarthritis on this or prior visit] : x-ray evidence of Osteoarthritis on this or prior visit [Betadine] : betadine [Ethyl Chloride sprayed topically] : ethyl chloride sprayed topically [Euflexxa(20mg)] : 20mg of Euflexxa [Sterile technique used] : sterile technique used [#1] : series #1 [] : Patient tolerated procedure well [Apply ice for 15min out of every hour for the next 12-24 hours as tolerated] : apply ice for 15 minutes out of every hour for the next 12-24 hours as tolerated [Call if redness, pain or fever occur] : call if redness, pain or fever occur [Previous OTC use and PT nontherapeutic] : patient has tried OTC's including aspirin, Ibuprofen, Aleve, etc or prescription NSAIDS, and/or exercises at home and/or physical therapy without satisfactory response [Risks, benefits, alternatives discussed / Verbal consent obtained] : the risks benefits, and alternatives have been discussed, and verbal consent was obtained [Morbid obesity] : morbid obesity

## 2024-04-05 ENCOUNTER — APPOINTMENT (OUTPATIENT)
Dept: ORTHOPEDIC SURGERY | Facility: CLINIC | Age: 64
End: 2024-04-05
Payer: COMMERCIAL

## 2024-04-05 VITALS — HEIGHT: 71 IN | WEIGHT: 260 LBS | BODY MASS INDEX: 36.4 KG/M2

## 2024-04-05 DIAGNOSIS — Z78.9 OTHER SPECIFIED HEALTH STATUS: ICD-10-CM

## 2024-04-05 PROCEDURE — 20611 DRAIN/INJ JOINT/BURSA W/US: CPT | Mod: 50

## 2024-04-05 NOTE — HISTORY OF PRESENT ILLNESS
[Dull/Aching] : dull/aching [1] : 1 [Euflexxa] : Euflexxa [de-identified] : 4/5/24: Euflexxa #2 b/l knees.  Previous doc:  6/20/23: 62M presenting with b/l hip pain that started 2-3 years ago following bypass surgery. Pain is exacerbated by walking, even short distances are painful. No groin pain. Most pain is on lateral aspect of hips. Left worse than right. Pain when getting up from a seated position. Pt is not taking any medication. Cannot take NSAIDs. Denies N/T. Pt also notes upper back pain but believes it to be from weight lifting at the gym a few days ago- not main concern. Has had PT in the past with no sig benefit. Pain is affecting daily life. 6/27/23: Pt here to review b/l hip MRI.  3/15/24: Here to f/up LT hip and LT knee. Did PT over the summer (for the hip_- unsure if benefit. States he has been more active recently and doing work around the house and having increasing pain. Most pain laterally- no groin pain. States LT knee bothers him the most wth going down stairs. Saw Dr. Alarcon last year and did Euflexxa series for b/l knees- helped more in the RT knee. Not taking meds for pain- no NSAIDs due to hx of bypass surgery. Ambulates without assistance.  3/29/24: Euflexxa #1 b/l knees. [de-identified] : haseeb knees  [de-identified] : Euflexxa

## 2024-04-05 NOTE — ASSESSMENT
[FreeTextEntry1] : Previous doc: 6/20/23: Pt with bilateral hip and lumbar pain. does not look like it is related to mild arthritis. Likely abductor injury or tendonitis vs ddd. would recommend mri of bilateral hips and lumbar spine as he has failed oral antiinflammatories and 2-3 months of PT. Follow up after MRI 6/27/23:  mult reason in his hip and l spine  - we will restart PT focused on hip and spine asnd start Mobic for a few weeks -  may need hip or l spine injection    3/15/24: Pt with moderate b/l knee OA (L>R), mild hip OA and some lumbar DDD. I believe his pain is likely a combination of these things. Discussed treatment options- risks and benefits explained. He is well informed and would like to proceed with LT hip bursa injection. Will put in auth for b/l knee Euflexxa. Follow up 2 weeks.  3/29/24: Inj tolerated well.  4/5/24: Inj tolerated well.

## 2024-04-05 NOTE — PROCEDURE
[Large Joint Injection] : Large joint injection [Bilateral] : bilaterally of the [Knee] : knee [Pain] : pain [Inflammation] : inflammation [X-ray evidence of Osteoarthritis on this or prior visit] : x-ray evidence of Osteoarthritis on this or prior visit [Repeat series performed] : repeat series performed [Betadine] : betadine [Ethyl Chloride sprayed topically] : ethyl chloride sprayed topically [Sterile technique used] : sterile technique used [Euflexxa(20mg)] : 20mg of Euflexxa [] : Patient tolerated procedure well [Call if redness, pain or fever occur] : call if redness, pain or fever occur [Apply ice for 15min out of every hour for the next 12-24 hours as tolerated] : apply ice for 15 minutes out of every hour for the next 12-24 hours as tolerated [Previous OTC use and PT nontherapeutic] : patient has tried OTC's including aspirin, Ibuprofen, Aleve, etc or prescription NSAIDS, and/or exercises at home and/or physical therapy without satisfactory response [Risks, benefits, alternatives discussed / Verbal consent obtained] : the risks benefits, and alternatives have been discussed, and verbal consent was obtained [Morbid obesity] : morbid obesity [#2] : series #2

## 2024-04-05 NOTE — IMAGING
[de-identified] : left hip \par  loss of internal rotation\par  no pain\par  - impingement\par  \par  right hip\par  good ROM\par  nontender\par  \par  b/l hips\par  5/5 strength\par  good pulse\par  \par  l-spine\par  greater troch tenderness\par  lower lumbar tenderness with palpation\par  \par

## 2024-04-16 ENCOUNTER — APPOINTMENT (OUTPATIENT)
Dept: ORTHOPEDIC SURGERY | Facility: CLINIC | Age: 64
End: 2024-04-16
Payer: COMMERCIAL

## 2024-04-16 VITALS — BODY MASS INDEX: 36.4 KG/M2 | WEIGHT: 260 LBS | HEIGHT: 71 IN

## 2024-04-16 DIAGNOSIS — M17.0 BILATERAL PRIMARY OSTEOARTHRITIS OF KNEE: ICD-10-CM

## 2024-04-16 PROCEDURE — 20611 DRAIN/INJ JOINT/BURSA W/US: CPT | Mod: 50

## 2024-04-16 NOTE — IMAGING
[de-identified] : left hip \par  loss of internal rotation\par  no pain\par  - impingement\par  \par  right hip\par  good ROM\par  nontender\par  \par  b/l hips\par  5/5 strength\par  good pulse\par  \par  l-spine\par  greater troch tenderness\par  lower lumbar tenderness with palpation\par  \par

## 2024-04-16 NOTE — HISTORY OF PRESENT ILLNESS
[de-identified] : 4/16/24: Euflexxa #3 b/l knees.  Previous doc:  6/20/23: 62M presenting with b/l hip pain that started 2-3 years ago following bypass surgery. Pain is exacerbated by walking, even short distances are painful. No groin pain. Most pain is on lateral aspect of hips. Left worse than right. Pain when getting up from a seated position. Pt is not taking any medication. Cannot take NSAIDs. Denies N/T. Pt also notes upper back pain but believes it to be from weight lifting at the gym a few days ago- not main concern. Has had PT in the past with no sig benefit. Pain is affecting daily life. 6/27/23: Pt here to review b/l hip MRI.  3/15/24: Here to f/up LT hip and LT knee. Did PT over the summer (for the hip_- unsure if benefit. States he has been more active recently and doing work around the house and having increasing pain. Most pain laterally- no groin pain. States LT knee bothers him the most wth going down stairs. Saw Dr. Alarcon last year and did Euflexxa series for b/l knees- helped more in the RT knee. Not taking meds for pain- no NSAIDs due to hx of bypass surgery. Ambulates without assistance.  3/29/24: Euflexxa #1 b/l knees. 4/5/24: Euflexxa #2 b/l knees.

## 2024-04-16 NOTE — PROCEDURE
[Large Joint Injection] : Large joint injection [Bilateral] : bilaterally of the [Knee] : knee [Pain] : pain [Inflammation] : inflammation [X-ray evidence of Osteoarthritis on this or prior visit] : x-ray evidence of Osteoarthritis on this or prior visit [Repeat series performed] : repeat series performed [Betadine] : betadine [Ethyl Chloride sprayed topically] : ethyl chloride sprayed topically [Sterile technique used] : sterile technique used [Euflexxa(20mg)] : 20mg of Euflexxa [] : Patient tolerated procedure well [Call if redness, pain or fever occur] : call if redness, pain or fever occur [Apply ice for 15min out of every hour for the next 12-24 hours as tolerated] : apply ice for 15 minutes out of every hour for the next 12-24 hours as tolerated [Previous OTC use and PT nontherapeutic] : patient has tried OTC's including aspirin, Ibuprofen, Aleve, etc or prescription NSAIDS, and/or exercises at home and/or physical therapy without satisfactory response [Risks, benefits, alternatives discussed / Verbal consent obtained] : the risks benefits, and alternatives have been discussed, and verbal consent was obtained [Morbid obesity] : morbid obesity [#3] : series #3

## 2024-04-16 NOTE — ASSESSMENT
[FreeTextEntry1] : Previous doc: 6/20/23: Pt with bilateral hip and lumbar pain. does not look like it is related to mild arthritis. Likely abductor injury or tendonitis vs ddd. would recommend mri of bilateral hips and lumbar spine as he has failed oral antiinflammatories and 2-3 months of PT. Follow up after MRI 6/27/23:  mult reason in his hip and l spine  - we will restart PT focused on hip and spine asnd start Mobic for a few weeks -  may need hip or l spine injection    3/15/24: Pt with moderate b/l knee OA (L>R), mild hip OA and some lumbar DDD. I believe his pain is likely a combination of these things. Discussed treatment options- risks and benefits explained. He is well informed and would like to proceed with LT hip bursa injection. Will put in auth for b/l knee Euflexxa. Follow up 2 weeks.  3/29/24: Inj tolerated well. 4/5/24: Inj tolerated well.  4/16/24: Inj tolerated well.